# Patient Record
Sex: FEMALE | Race: WHITE | NOT HISPANIC OR LATINO | Employment: STUDENT | URBAN - METROPOLITAN AREA
[De-identification: names, ages, dates, MRNs, and addresses within clinical notes are randomized per-mention and may not be internally consistent; named-entity substitution may affect disease eponyms.]

---
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
## 2019-07-25 ENCOUNTER — TRANSCRIBE ORDERS (OUTPATIENT)
Dept: ADMINISTRATIVE | Facility: HOSPITAL | Age: 19
End: 2019-07-25

## 2019-07-25 ENCOUNTER — APPOINTMENT (OUTPATIENT)
Dept: LAB | Facility: MEDICAL CENTER | Age: 19
End: 2019-07-25
Payer: COMMERCIAL

## 2019-07-25 DIAGNOSIS — Z51.81 ENCOUNTER FOR THERAPEUTIC DRUG MONITORING: ICD-10-CM

## 2019-07-25 DIAGNOSIS — F41.1 GENERALIZED ANXIETY DISORDER: Primary | ICD-10-CM

## 2019-07-25 LAB
ALBUMIN SERPL BCP-MCNC: 3.6 G/DL (ref 3.5–5)
ALP SERPL-CCNC: 81 U/L (ref 46–384)
ALT SERPL W P-5'-P-CCNC: 19 U/L (ref 12–78)
ANION GAP SERPL CALCULATED.3IONS-SCNC: 7 MMOL/L (ref 4–13)
AST SERPL W P-5'-P-CCNC: 22 U/L (ref 5–45)
BASOPHILS # BLD AUTO: 0.02 THOUSANDS/ΜL (ref 0–0.1)
BASOPHILS NFR BLD AUTO: 0 % (ref 0–1)
BILIRUB SERPL-MCNC: 0.31 MG/DL (ref 0.2–1)
BUN SERPL-MCNC: 9 MG/DL (ref 5–25)
CALCIUM SERPL-MCNC: 9.4 MG/DL (ref 8.3–10.1)
CHLORIDE SERPL-SCNC: 107 MMOL/L (ref 100–108)
CHOLEST SERPL-MCNC: 173 MG/DL (ref 50–200)
CO2 SERPL-SCNC: 21 MMOL/L (ref 21–32)
CREAT SERPL-MCNC: 0.76 MG/DL (ref 0.6–1.3)
EOSINOPHIL # BLD AUTO: 0.07 THOUSAND/ΜL (ref 0–0.61)
EOSINOPHIL NFR BLD AUTO: 1 % (ref 0–6)
ERYTHROCYTE [DISTWIDTH] IN BLOOD BY AUTOMATED COUNT: 12.3 % (ref 11.6–15.1)
GFR SERPL CREATININE-BSD FRML MDRD: 114 ML/MIN/1.73SQ M
GLUCOSE P FAST SERPL-MCNC: 73 MG/DL (ref 65–99)
HCT VFR BLD AUTO: 41.1 % (ref 34.8–46.1)
HDLC SERPL-MCNC: 71 MG/DL (ref 40–60)
HGB BLD-MCNC: 13.4 G/DL (ref 11.5–15.4)
IMM GRANULOCYTES # BLD AUTO: 0.01 THOUSAND/UL (ref 0–0.2)
IMM GRANULOCYTES NFR BLD AUTO: 0 % (ref 0–2)
LDLC SERPL CALC-MCNC: 79 MG/DL (ref 0–100)
LYMPHOCYTES # BLD AUTO: 2.71 THOUSANDS/ΜL (ref 0.6–4.47)
LYMPHOCYTES NFR BLD AUTO: 46 % (ref 14–44)
MCH RBC QN AUTO: 28.9 PG (ref 26.8–34.3)
MCHC RBC AUTO-ENTMCNC: 32.6 G/DL (ref 31.4–37.4)
MCV RBC AUTO: 89 FL (ref 82–98)
MONOCYTES # BLD AUTO: 0.37 THOUSAND/ΜL (ref 0.17–1.22)
MONOCYTES NFR BLD AUTO: 6 % (ref 4–12)
NEUTROPHILS # BLD AUTO: 2.73 THOUSANDS/ΜL (ref 1.85–7.62)
NEUTS SEG NFR BLD AUTO: 47 % (ref 43–75)
NONHDLC SERPL-MCNC: 102 MG/DL
NRBC BLD AUTO-RTO: 0 /100 WBCS
PLATELET # BLD AUTO: 249 THOUSANDS/UL (ref 149–390)
PMV BLD AUTO: 10.7 FL (ref 8.9–12.7)
POTASSIUM SERPL-SCNC: 3.7 MMOL/L (ref 3.5–5.3)
PROT SERPL-MCNC: 8 G/DL (ref 6.4–8.2)
RBC # BLD AUTO: 4.63 MILLION/UL (ref 3.81–5.12)
SODIUM SERPL-SCNC: 135 MMOL/L (ref 136–145)
TRIGL SERPL-MCNC: 115 MG/DL
WBC # BLD AUTO: 5.91 THOUSAND/UL (ref 4.31–10.16)

## 2019-07-25 PROCEDURE — 85025 COMPLETE CBC W/AUTO DIFF WBC: CPT | Performed by: PSYCHIATRY & NEUROLOGY

## 2019-07-25 PROCEDURE — 80053 COMPREHEN METABOLIC PANEL: CPT | Performed by: PSYCHIATRY & NEUROLOGY

## 2019-07-25 PROCEDURE — 80061 LIPID PANEL: CPT | Performed by: PSYCHIATRY & NEUROLOGY

## 2019-07-25 PROCEDURE — 36415 COLL VENOUS BLD VENIPUNCTURE: CPT | Performed by: PSYCHIATRY & NEUROLOGY

## 2020-04-29 ENCOUNTER — OFFICE VISIT (OUTPATIENT)
Dept: PEDIATRICS CLINIC | Facility: MEDICAL CENTER | Age: 20
End: 2020-04-29
Payer: COMMERCIAL

## 2020-04-29 VITALS
WEIGHT: 126 LBS | RESPIRATION RATE: 18 BRPM | SYSTOLIC BLOOD PRESSURE: 100 MMHG | HEART RATE: 84 BPM | HEIGHT: 63 IN | BODY MASS INDEX: 22.32 KG/M2 | TEMPERATURE: 99.2 F | DIASTOLIC BLOOD PRESSURE: 68 MMHG

## 2020-04-29 DIAGNOSIS — Z00.00 ANNUAL PHYSICAL EXAM: Primary | ICD-10-CM

## 2020-04-29 DIAGNOSIS — Z11.3 SCREENING FOR STDS (SEXUALLY TRANSMITTED DISEASES): ICD-10-CM

## 2020-04-29 DIAGNOSIS — Z11.4 SCREENING FOR HIV (HUMAN IMMUNODEFICIENCY VIRUS): ICD-10-CM

## 2020-04-29 PROCEDURE — 99395 PREV VISIT EST AGE 18-39: CPT | Performed by: PEDIATRICS

## 2020-04-29 PROCEDURE — 92551 PURE TONE HEARING TEST AIR: CPT | Performed by: PEDIATRICS

## 2020-04-29 PROCEDURE — 3008F BODY MASS INDEX DOCD: CPT | Performed by: PEDIATRICS

## 2020-04-30 ENCOUNTER — APPOINTMENT (OUTPATIENT)
Dept: LAB | Facility: MEDICAL CENTER | Age: 20
End: 2020-04-30
Payer: COMMERCIAL

## 2020-04-30 DIAGNOSIS — Z11.4 SCREENING FOR HIV (HUMAN IMMUNODEFICIENCY VIRUS): ICD-10-CM

## 2020-04-30 DIAGNOSIS — Z11.3 SCREENING FOR STDS (SEXUALLY TRANSMITTED DISEASES): ICD-10-CM

## 2020-04-30 LAB
C TRACH DNA SPEC QL NAA+PROBE: NEGATIVE
N GONORRHOEA DNA SPEC QL NAA+PROBE: NEGATIVE

## 2020-04-30 PROCEDURE — 87591 N.GONORRHOEAE DNA AMP PROB: CPT

## 2020-04-30 PROCEDURE — 36415 COLL VENOUS BLD VENIPUNCTURE: CPT

## 2020-04-30 PROCEDURE — 87389 HIV-1 AG W/HIV-1&-2 AB AG IA: CPT

## 2020-04-30 PROCEDURE — 87491 CHLMYD TRACH DNA AMP PROBE: CPT

## 2020-05-01 LAB — HIV 1+2 AB+HIV1 P24 AG SERPL QL IA: NORMAL

## 2021-07-07 ENCOUNTER — CLINICAL SUPPORT (OUTPATIENT)
Dept: DENTISTRY | Facility: CLINIC | Age: 21
End: 2021-07-07

## 2021-07-07 VITALS — DIASTOLIC BLOOD PRESSURE: 74 MMHG | TEMPERATURE: 97.9 F | SYSTOLIC BLOOD PRESSURE: 114 MMHG | HEART RATE: 71 BPM

## 2021-07-07 DIAGNOSIS — Z01.20 ENCOUNTER FOR DENTAL EXAMINATION AND CLEANING WITHOUT ABNORMAL FINDINGS: Primary | ICD-10-CM

## 2021-07-07 PROCEDURE — D0120 PERIODIC ORAL EVALUATION - ESTABLISHED PATIENT: HCPCS | Performed by: DENTIST

## 2021-07-07 PROCEDURE — D1110 PROPHYLAXIS - ADULT: HCPCS

## 2021-07-07 NOTE — PROGRESS NOTES
RECALL EXAM, ADULT PROPHY    CHIEF CONCERN: none   PAIN SCALE: 0  ASA CLASS: I  PLAQUE: mild   CALCULUS:mod calculus/lower anterior  BLEEDING: light   STAIN : light  ORAL HYGIENE:  fair   PERIO: gingiva slight gingivitis    Hand scaled, polished and flossed  Oral Hygiene Instruction :  recommended brushing 2 x daily for 2 minutes MIN, recommended flossing daily, reviewed dietary precautions  soft tissue evaluation     Soft tissue exam:  soft tissue exam was normal  ExtraOral exam:   Extraoral exam was normal    Dr Carol Ann Sawant exam=   Reviewed with patient clinical  findings and parent verbalized understanding  All questions and concerns addressed  Watch wisdom teeth for now     REFERRALS: no referrals needed    CARIES FINDINGS:  no caries noted    Next Recall: 6 month recall ( last fmx + panorex 1/4/21)

## 2022-01-12 ENCOUNTER — CLINICAL SUPPORT (OUTPATIENT)
Dept: DENTISTRY | Facility: CLINIC | Age: 22
End: 2022-01-12

## 2022-01-12 DIAGNOSIS — Z01.20 ENCOUNTER FOR DENTAL EXAMINATION: Primary | ICD-10-CM

## 2022-01-12 PROCEDURE — D0120 PERIODIC ORAL EVALUATION - ESTABLISHED PATIENT: HCPCS

## 2022-01-12 PROCEDURE — D1110 PROPHYLAXIS - ADULT: HCPCS

## 2022-01-12 PROCEDURE — D0274 BITEWINGS - 4 RADIOGRAPHIC IMAGES: HCPCS

## 2022-01-12 NOTE — PROGRESS NOTES
RECALL EXAM, ADULT PROPHY,  4 BWX     CHIEF CONCERN: none   PAIN SCALE: 0  ASA CLASS: I  PLAQUE: mild   CALCULUS:  light calculus -mod calculus/lower anterior  BLEEDING:  light -moderate   STAIN :none   ORAL HYGIENE:  good  PERIO: slight gingivitis  Pt reports she is flossing daily  Hand scaled, polished and flossed  Oral Hygiene Instruction :  recommended brushing 2 x daily for 2 minutes MIN, recommended flossing daily, reviewed dietary precautions  Soft tissue exam:  soft tissue exam was normal  ExtraOral exam:   Extraoral exam was normal    Dr Curry Like exam=   Reviewed with patient clinical and radiographicfindings and patient verbalized understanding  All questions and concerns addressed       REFERRALS: [facility] referral provided for wisdom teeth , also copy of panorex    CARIES FINDINGS:  no caries noted    Next Recall: 6 month recall ( fmx +panorex last taken 1/4/21)

## 2022-09-07 ENCOUNTER — OFFICE VISIT (OUTPATIENT)
Dept: DENTISTRY | Facility: CLINIC | Age: 22
End: 2022-09-07

## 2022-09-07 VITALS — HEART RATE: 90 BPM | DIASTOLIC BLOOD PRESSURE: 81 MMHG | SYSTOLIC BLOOD PRESSURE: 118 MMHG

## 2022-09-07 DIAGNOSIS — Z01.20 ENCOUNTER FOR DENTAL EXAM AND CLEANING W/O ABNORMAL FINDINGS: Primary | ICD-10-CM

## 2022-09-07 PROCEDURE — D1110 PROPHYLAXIS - ADULT: HCPCS

## 2022-09-07 PROCEDURE — D0120 PERIODIC ORAL EVALUATION - ESTABLISHED PATIENT: HCPCS | Performed by: DENTIST

## 2022-09-07 NOTE — PROGRESS NOTES
PERIODIC EXAM, ADULT PROPHY  Last recall was 1/12/ 22   REVIEWED MED HX: meds, allergies, health changes reviewed in EPIC  CHIEF CONCERN:  none   PAIN SCALE:  0  ASA CLASS:  I  PLAQUE:  mild -moderate   CALCULUS:   light calculus /mostly lower anteriors  BLEEDING:    light   STAIN :   none   ORAL HYGIENE:  good  PERIO: spot probed/recorded  All readings WNL  Slight gingivitis    Hand scaled, polished and flossed  Used cavitron  Pt is scheduled for wisdom teeth consult in NOV  Oral Hygiene Instruction:  recommended brushing 2 x daily for 2 minutes MIN, recommended flossing daily, reviewed dietary precautions  Visual and Tactile Intraoral/ Extraoral evaluation: Oral and Oropharyngeal cancer evaluation  No findings  Slight maxillary bharat  Dr Yousif Ca exam=   Reviewed with patient clinical  findings and patient verbalized understanding  All questions and concerns addressed       REFERRALS: no referrals needed    CARIES FINDINGS:  no caries noted    Next Recall: 6 month recall     Last BWX: 1/12/22  Last Panorex/ FMX : 1/4/21

## 2023-11-07 NOTE — PROGRESS NOTES
PERIODIC EXAM, ADULT PROPHY    REVIEWED MED HX: meds, allergies, health changes reviewed in [facility]. All consents signed. CHIEF CONCERN:  none   PAIN SCALE:  0  ASA CLASS:  I  PLAQUE:  mild    moderate    heavy  *  CALCULUS:  no calculus noted     light   moderate    heavy *  BLEEDING:   none    light   moderate   heavy  STAIN :   none    light    moderate   heavy*  ORAL HYGIENE:  good    fair   poor/needs improvement  PERIO:     Hand scaled, polished and flossed. Used Cavitron. Oral Hygiene Instruction:  recommended brushing 2 x daily for 2 minutes MIN, recommended flossing daily, reviewed dietary precautions. Dispensed: toothbrush, toothpaste and floss    Visual and Tactile Intraoral/ Extraoral evaluation: Oral and Oropharyngeal cancer evaluation. No findings     Dr. Arsen Barbour  exam=   Reviewed with patient clinical and radiographic findings and patient verbalized understanding. All questions and concerns addressed.      REFERRALS: no referrals needed    CARIES FINDINGS:        TREATMENT  PLAN :   1)     Next Recall: 6 month recall     Last BWX: 4/10/23  Last Panorex/ FMX :

## 2023-11-08 ENCOUNTER — OFFICE VISIT (OUTPATIENT)
Dept: DENTISTRY | Facility: CLINIC | Age: 23
End: 2023-11-08

## 2023-11-08 VITALS — SYSTOLIC BLOOD PRESSURE: 117 MMHG | DIASTOLIC BLOOD PRESSURE: 76 MMHG | HEART RATE: 86 BPM

## 2023-11-08 DIAGNOSIS — Z01.20 ENCOUNTER FOR DENTAL EXAM AND CLEANING W/O ABNORMAL FINDINGS: Primary | ICD-10-CM

## 2023-11-08 DIAGNOSIS — Z01.20 ENCOUNTER FOR DENTAL EXAMINATION: ICD-10-CM

## 2023-11-08 PROCEDURE — D0120 PERIODIC ORAL EVALUATION - ESTABLISHED PATIENT: HCPCS

## 2023-11-08 PROCEDURE — D1110 PROPHYLAXIS - ADULT: HCPCS

## 2023-11-08 NOTE — DENTAL PROCEDURE DETAILS
Kizzy Harrington presents for a Periodic exam. Verbal consent for treatment given in addition to the forms. Reviewed health history - Patient is ASA I  Consents signed: Yes     Perio: Normal  Pain Scale: 0  Caries Assessment: Low  Radiographs: None          PERIODIC EXAM, ADULT PROPHY    REVIEWED MED HX: meds, allergies, health changes reviewed in [facility]. All consents signed. CHIEF CONCERN:  pt reports on UL sometimes discomfort area. Not often but occasionally while eating. PAIN SCALE:  0  ASA CLASS:  I  PLAQUE:  mild    CALCULUS:      light    BLEEDING: light    STAIN :   none    ORAL HYGIENE:  good    PERIO: gingiva appear healthy    Hand scaled, polished and flossed. Used Cavitron. Dispensed: toothbrush, toothpaste and flossers    Visual and Tactile Intraoral/ Extraoral evaluation: Oral and Oropharyngeal cancer evaluation. No findings     Dr. Alex Chambers exam=   Reviewed with patient clinical and radiographic findings and patient verbalized understanding. All questions and concerns addressed. Pt previously saw [facility] for wisdom teeth. REFERRALS: Referral previously given for [facility] in 2022. Referral given again.     CARIES FINDINGS: no decay noted    Next Recall: 6 month recall / update BWX    Last BWX: 4/10/23  Last FMX : 1/4/21

## 2024-08-22 NOTE — PROGRESS NOTES
PERIODIC EXAM, ADULT PROPHY , 4 BWX   REVIEWED MED HX: meds, allergies, health changes reviewed in [facility]. All consents signed.  CHIEF CONCERN: no dental pain or concerns  PAIN SCALE:  0  ASA CLASS:  I  PLAQUE:  {Plaque Level:23404}  CALCULUS:  {None light moderate heavy:50413}  BLEEDING:   {None light moderate heavy:62637}  STAIN :   {None light moderate heavy:20143}      PERIO:     Hygiene Procedures:  Scaled, Polished, Flossed and Used Cavitron    Oral Hygiene Instruction: Brushing Minimum 2x daily for 2 minutes, daily flossing and Reviewed dietary precautions    Dispensed: Toothbrush, Toothpaste, and Floss    Visual and Tactile Intraoral/ Extraoral evaluation: Oral and Oropharyngeal cancer evaluation. No findings     {Exam:95200}   Reviewed with patient clinical and radiographic findings and patient verbalized understanding. All questions and concerns addressed.     REFERRALS: none    CARIES FINDINGS:        TREATMENT  PLAN :   NV:     Next Recall: 6 month recall     Last BWX: 4/10/23  Last Panorex/ FMX : 1/4/21

## 2024-08-23 ENCOUNTER — OFFICE VISIT (OUTPATIENT)
Dept: DENTISTRY | Facility: CLINIC | Age: 24
End: 2024-08-23

## 2024-08-23 DIAGNOSIS — Z01.20 ENCOUNTER FOR DENTAL EXAM AND CLEANING W/O ABNORMAL FINDINGS: Primary | ICD-10-CM

## 2024-08-23 PROCEDURE — D0274 BITEWINGS - 4 RADIOGRAPHIC IMAGES: HCPCS

## 2024-08-23 PROCEDURE — D0120 PERIODIC ORAL EVALUATION - ESTABLISHED PATIENT: HCPCS

## 2024-08-23 PROCEDURE — D1110 PROPHYLAXIS - ADULT: HCPCS

## 2024-08-23 RX ORDER — CITALOPRAM HYDROBROMIDE 20 MG/1
20 TABLET ORAL DAILY
COMMUNITY
Start: 2024-04-30

## 2024-08-23 NOTE — DENTAL PROCEDURE DETAILS
PERIODIC EXAM, ADULT PROPHY , 4 BWX   REVIEWED MED HX: meds, allergies, health changes reviewed in [facility]. All consents signed.  CHIEF CONCERN: no dental pain or concerns  PAIN SCALE:  0  ASA CLASS:  I  PLAQUE:  mild  CALCULUS:  light-mostly lower anterior lingual  BLEEDING:   light  STAIN :   none      PERIO: gingiva appear healthy    Hygiene Procedures:  Scaled, Polished, Flossed and Used Cavitron    Oral Hygiene Instruction: Brushing Minimum 2x daily for 2 minutes, daily flossing and Reviewed dietary precautions    Dispensed: Toothbrush, Toothpaste, and Floss    Visual and Tactile Intraoral/ Extraoral evaluation: Oral and Oropharyngeal cancer evaluation. No findings     Dr. Casey   Reviewed with patient clinical and radiographic findings and patient verbalized understanding. All questions and concerns addressed.     REFERRALS: none    CARIES FINDINGS: no decay noted    Next Recall: 6 month recall     Last BWX: 8/23/24  Last Panorex/ FMX : 1/4/21

## 2024-10-02 ENCOUNTER — TELEPHONE (OUTPATIENT)
Dept: DENTISTRY | Facility: CLINIC | Age: 24
End: 2024-10-02

## 2024-10-02 NOTE — TELEPHONE ENCOUNTER
Called pt returning her call regarding the updated insurance. I left her a message updating her that the previous dos was submitted to the insurance and that we are just waiting for payment

## 2025-05-06 NOTE — PROGRESS NOTES
PERIODIC EXAM, ADULT PROPHY , (no xrays due )   REVIEWED MED HX: meds, allergies, health changes reviewed in [facility]. All consents signed.  CHIEF CONCERN: no dental pain or concerns  PAIN SCALE:  0  ASA CLASS:  II  PLAQUE:  mild  CALCULUS:  light-mostly lower anterior  BLEEDING:   light  STAIN :   none      PERIO: gums appear healthy    Hygiene Procedures:  Scaled, Polished, Flossed and Used Cavitron    Oral Hygiene Instruction: Brushing Minimum 2x daily for 2 minutes, daily flossing    Dispensed: Toothbrush, Toothpaste, Floss    Visual and Tactile Intraoral/ Extraoral evaluation: Oral and Oropharyngeal cancer evaluation. No findings     Dr. Holt   Reviewed with patient clinical and radiographic findings and patient verbalized understanding. All questions and concerns addressed.   Pt not interested in ortho at this time    REFERRALS: none    CARIES FINDINGS: no decay noted    Next Recall: 6 month recall     Last BWX: 8/23/24  Last Panorex/ FMX : 1/4/21

## 2025-05-07 ENCOUNTER — OFFICE VISIT (OUTPATIENT)
Dept: DENTISTRY | Facility: CLINIC | Age: 25
End: 2025-05-07

## 2025-05-07 VITALS — HEART RATE: 101 BPM | SYSTOLIC BLOOD PRESSURE: 119 MMHG | DIASTOLIC BLOOD PRESSURE: 74 MMHG

## 2025-05-07 DIAGNOSIS — Z01.20 ENCOUNTER FOR DENTAL EXAM AND CLEANING W/O ABNORMAL FINDINGS: Primary | ICD-10-CM

## 2025-05-07 PROCEDURE — D0120 PERIODIC ORAL EVALUATION - ESTABLISHED PATIENT: HCPCS

## 2025-05-07 PROCEDURE — D1110 PROPHYLAXIS - ADULT: HCPCS
